# Patient Record
(demographics unavailable — no encounter records)

---

## 2025-01-08 NOTE — ASSESSMENT
[FreeTextEntry1] : 83 yo male on AS for prostate ca and on dual medical therapy for LUTS.  Will check his PSA today.  He will continue with tamsulosin 0.4 mg nightly and finasteride 5 mg daily.  He will RTC in 6 months for follow up.

## 2025-01-08 NOTE — PHYSICAL EXAM
[Normal Appearance] : normal appearance [General Appearance - In No Acute Distress] : no acute distress [Heart Rate And Rhythm] : heart rate and rhythm were normal [] : no respiratory distress [Normal Station and Gait] : the gait and station were normal for the patient's age [Skin Color & Pigmentation] : normal skin color and pigmentation [No Focal Deficits] : no focal deficits [Oriented To Time, Place, And Person] : oriented to person, place, and time [Not Anxious] : not anxious

## 2025-01-08 NOTE — HISTORY OF PRESENT ILLNESS
[FreeTextEntry1] : [Dr. Villavicencio previous notes] This is a 74yo male with recent PSA of 8.2. No previous PSA are available. His father was diagnosed with prostate cancer in his 70s but  in his 90s of other causes. He has significant LUTS with frequency, urgency, nocturia, straining, weak stream and double voiding. He has normal erections. No significant past medical or surgical history. He takes no medications.   3/28/16 Here for f/u. 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 He has significant LUTS and was recently started on tamsulosin by his PCP. Symptoms are mildly improved.   16 Here for f/u. No significant changes. Still c/o slow stream, incomplete emptying on tamsulosin. TRUS prostate volume was 24cc. 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   16 Here to review MRI results. MRI shows enlarged prostate, 92gm with median lobe, considerably larger than measured on transrectal US. No suspicious PI-RADS 4 or 5 lesions noted. BPH symptoms not bothersome with Flomax.  16 Here s/p prostate biopsy. No complications from biopsy. Path: Shayla 3+3=6 prostate cancer in anterior apex, 20% of core involved from that area, all other biopsies negative.   16 Here for f/u. Biopsy sent for Oncotype DX testing which showed high likelihood (88%) that he is in the very low risk category. Voiding symptoms stable, moderately bothersome.   3/6/17 Here for f/u. Feeling well, voiding symptoms not currently bothersome.   17 Here for f/u. Feeling well, no complaints. Due for repeat prostate biopsy.   17 Here for biopsy f/u. No complications from biopsy.  Path:  Right apex: Shayla 3+4, 5% tissue involved Left apex: Shayla 3+4, 5% tissue involved Anterior apex: [place] 3+4, 20% tissue involved  17 Here for f/u. Seen at [facility] for consultation for focal therapy.   10/23/17 Here for f/u. He was seen at [facility] and recommended to undergo EBRT. He is still interested in focal therapy.   17 Here for f/u. Awaiting consultation at [facility] to discuss focal therapy. Appointment is scheduled for mid-January.   18 Here for f/u. Had consultation at [facility] where biopsy was down-graded by pathology to Shayla 6 and was recommended to continue active surveillance.   18 Here for f/u. PSA has been stable. He stopped tamsulosin and no change in urinary symptoms.  19 Here for f/u. PSA has been stable. No bothersome symptoms. Unable to sit for last MRI due to claustrophobia.   19 Here for f/u. PSA has been stable around 8-9. No bothersome symptoms, taking tamsulosin 0.8mg at night.   20 Here for f/u. Last PSA stable at 9. No bothersome symptoms.   20 Here for f/u. Last PSA around 10. Primary complaint nocturia, no improvement with tamsulosin 0.8mg.   21 Here for f/u. Last PSA around 10. Primary complaint nocturia, taking tamsulosin 0.4mg.   21 Here for f/u. PSA has remained stable around 10, last PSA in 2021. Feeling well, no changes.   3/9/22 Here for f/u. Last PSA up to 14. Feeling well, no changes.   22: Here for f/u. Last PSA 12.8. Feeling well, no changes- still have nocturia, some UUI.  He is on tamsulosin 0.8 mg daily. PSA 12.6 2 months ago with his PCP.. Denies dysuria, gross hematuria. Reports no issues with erections.   5/3/23 Here for f/u. Reports he had imaging ordered by PCP for abdominal pain and was told  "his prostate has  gone into kidney."  Renal US  performed at [facility] shows a likely right renal cyst. This cyst is also seen on non-con CT performed , though it appears hyperdense and was told there was a mass. The patient is feeling well,  has no complaints.   23 Here for f/u. CT renal mass protocol shows renal lesion is a benign cyst PSA was down to 4 but he said outside PSA was 14 which is closer to his baseline.   23 Here for f/u. Doing well, no complaints  *************** 24: Patient returns for PSA.  He is voiding with his baseline LUTS.  His PVR today is stable.  He is otherwise well.  His last PSA was under 5.  He continues to take tamsulosin and finasteride for LUTS.   PVR = 294 PSA (23) = 4.37  ************ 25: Patient returns for follow up.  He is due for his PSA.  He has been on AS for low volume GG2 prostate cancer with stable PSA.  He continues to take tamsulosin and finasteride for LUTS.

## 2025-01-08 NOTE — HISTORY OF PRESENT ILLNESS
[FreeTextEntry1] : [Dr. Villavicencio previous notes] This is a 74yo male with recent PSA of 8.2. No previous PSA are available. His father was diagnosed with prostate cancer in his 70s but  in his 90s of other causes. He has significant LUTS with frequency, urgency, nocturia, straining, weak stream and double voiding. He has normal erections. No significant past medical or surgical history. He takes no medications.   3/28/16 Here for f/u. 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 He has significant LUTS and was recently started on tamsulosin by his PCP. Symptoms are mildly improved.   16 Here for f/u. No significant changes. Still c/o slow stream, incomplete emptying on tamsulosin. TRUS prostate volume was 24cc. 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   16 Here to review MRI results. MRI shows enlarged prostate, 92gm with median lobe, considerably larger than measured on transrectal US. No suspicious PI-RADS 4 or 5 lesions noted. BPH symptoms not bothersome with Flomax.  16 Here s/p prostate biopsy. No complications from biopsy. Path: Shayla 3+3=6 prostate cancer in anterior apex, 20% of core involved from that area, all other biopsies negative.   16 Here for f/u. Biopsy sent for Oncotype DX testing which showed high likelihood (88%) that he is in the very low risk category. Voiding symptoms stable, moderately bothersome.   3/6/17 Here for f/u. Feeling well, voiding symptoms not currently bothersome.   17 Here for f/u. Feeling well, no complaints. Due for repeat prostate biopsy.   17 Here for biopsy f/u. No complications from biopsy.  Path:  Right apex: Shayla 3+4, 5% tissue involved Left apex: Shayla 3+4, 5% tissue involved Anterior apex: [place] 3+4, 20% tissue involved  17 Here for f/u. Seen at [facility] for consultation for focal therapy.   10/23/17 Here for f/u. He was seen at [facility] and recommended to undergo EBRT. He is still interested in focal therapy.   17 Here for f/u. Awaiting consultation at [facility] to discuss focal therapy. Appointment is scheduled for mid-January.   18 Here for f/u. Had consultation at [facility] where biopsy was down-graded by pathology to Shayla 6 and was recommended to continue active surveillance.   18 Here for f/u. PSA has been stable. He stopped tamsulosin and no change in urinary symptoms.  19 Here for f/u. PSA has been stable. No bothersome symptoms. Unable to sit for last MRI due to claustrophobia.   19 Here for f/u. PSA has been stable around 8-9. No bothersome symptoms, taking tamsulosin 0.8mg at night.   20 Here for f/u. Last PSA stable at 9. No bothersome symptoms.   20 Here for f/u. Last PSA around 10. Primary complaint nocturia, no improvement with tamsulosin 0.8mg.   21 Here for f/u. Last PSA around 10. Primary complaint nocturia, taking tamsulosin 0.4mg.   21 Here for f/u. PSA has remained stable around 10, last PSA in 2021. Feeling well, no changes.   3/9/22 Here for f/u. Last PSA up to 14. Feeling well, no changes.   22: Here for f/u. Last PSA 12.8. Feeling well, no changes- still have nocturia, some UUI.  He is on tamsulosin 0.8 mg daily. PSA 12.6 2 months ago with his PCP.. Denies dysuria, gross hematuria. Reports no issues with erections.   5/3/23 Here for f/u. Reports he had imaging ordered by PCP for abdominal pain and was told  "his prostate has  gone into kidney."  Renal US  performed at [facility] shows a likely right renal cyst. This cyst is also seen on non-con CT performed , though it appears hyperdense and was told there was a mass. The patient is feeling well,  has no complaints.   23 Here for f/u. CT renal mass protocol shows renal lesion is a benign cyst PSA was down to 4 but he said outside PSA was 14 which is closer to his baseline.   23 Here for f/u. Doing well, no complaints  *************** 24: Patient returns for PSA.  He is voiding with his baseline LUTS.  His PVR today is stable.  He is otherwise well.  His last PSA was under 5.  He continues to take tamsulosin and finasteride for LUTS.   PVR = 294 PSA (23) = 4.37  ************ 25: Patient returns for follow up.  He is due for his PSA.  He has been on AS for low volume GG2 prostate cancer with stable PSA.  He continues to take tamsulosin and finasteride for LUTS.

## 2025-06-04 NOTE — ADDENDUM
[FreeTextEntry1] : I, Dr. Stern, personally performed the evaluation and management (E/M) services for this established patient who presents today with (a) new problem(s)/exacerbation of (an) existing condition(s).  That E/M includes conducting the examination, assessing all new/exacerbated conditions, and establishing a new plan of care.  Today, my ACP, Marlys Pierre, was here to observe my evaluation and management services for this new problem/exacerbated condition to be followed going forward. Never smoker

## 2025-06-04 NOTE — HISTORY OF PRESENT ILLNESS
[FreeTextEntry1] : [Dr. Villavicencio previous notes] This is a 72yo male with recent PSA of 8.2. No previous PSA are available. His father was diagnosed with prostate cancer in his 70s but  in his 90s of other causes. He has significant LUTS with frequency, urgency, nocturia, straining, weak stream and double voiding. He has normal erections. No significant past medical or surgical history. He takes no medications.  3/28/16 Here for f/u. 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 He has significant LUTS and was recently started on tamsulosin by his PCP. Symptoms are mildly improved.  16 Here for f/u. No significant changes. Still c/o slow stream, incomplete emptying on tamsulosin. TRUS prostate volume was 24cc. 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  16 Here to review MRI results. MRI shows enlarged prostate, 92gm with median lobe, considerably larger than measured on transrectal US. No suspicious PI-RADS 4 or 5 lesions noted. BPH symptoms not bothersome with Flomax.  16 Here s/p prostate biopsy. No complications from biopsy. Path: [place] 3+3=6 prostate cancer in anterior apex, 20% of core involved from that area, all other biopsies negative.  16 Here for f/u. Biopsy sent for Oncotype DX testing which showed high likelihood (88%) that he is in the very low risk category. Voiding symptoms stable, moderately bothersome.  3/6/17 Here for f/u. Feeling well, voiding symptoms not currently bothersome.  17 Here for f/u. Feeling well, no complaints. Due for repeat prostate biopsy.  17 Here for biopsy f/u. No complications from biopsy. Path: Right apex: Shayla 3+4, 5% tissue involved Left apex: Shayla 3+4, 5% tissue involved Anterior apex: [place] 3+4, 20% tissue involved  17 Here for f/u. Seen at [facility] for consultation for focal therapy.  10/23/17 Here for f/u. He was seen at [facility] and recommended to undergo EBRT. He is still interested in focal therapy.  17 Here for f/u. Awaiting consultation at [facility] to discuss focal therapy. Appointment is scheduled for mid-January.  18 Here for f/u. Had consultation at [facility] where biopsy was down-graded by pathology to Effie 6 and was recommended to continue active surveillance.  18 Here for f/u. PSA has been stable. He stopped tamsulosin and no change in urinary symptoms.  19 Here for f/u. PSA has been stable. No bothersome symptoms. Unable to sit for last MRI due to claustrophobia.  19 Here for f/u. PSA has been stable around 8-9. No bothersome symptoms, taking tamsulosin 0.8mg at night.  20 Here for f/u. Last PSA stable at 9. No bothersome symptoms.  20 Here for f/u. Last PSA around 10. Primary complaint nocturia, no improvement with tamsulosin 0.8mg.  21 Here for f/u. Last PSA around 10. Primary complaint nocturia, taking tamsulosin 0.4mg.  21 Here for f/u. PSA has remained stable around 10, last PSA in 2021. Feeling well, no changes.  3/9/22 Here for f/u. Last PSA up to 14. Feeling well, no changes.  22: Here for f/u. Last PSA 12.8. Feeling well, no changes- still have nocturia, some UUI. He is on tamsulosin 0.8 mg daily. PSA 12.6 2 months ago with his PCP.. Denies dysuria, gross hematuria. Reports no issues with erections.  5/3/23 Here for f/u. Reports he had imaging ordered by PCP for abdominal pain and was told "his prostate has gone into kidney." Renal US  performed at [facility] shows a likely right renal cyst. This cyst is also seen on non-con CT performed , though it appears hyperdense and was told there was a mass. The patient is feeling well, has no complaints.  23 Here for f/u. CT renal mass protocol shows renal lesion is a benign cyst PSA was down to 4 but he said outside PSA was 14 which is closer to his baseline.  23 Here for f/u. Doing well, no complaints  *************** 24: Patient returns for PSA. He is voiding with his baseline LUTS. His PVR today is stable. He is otherwise well. His last PSA was under 5. He continues to take tamsulosin and finasteride for LUTS.  PVR = 294 PSA (23) = 4.37  ************ 25: Patient returns for follow up. He is due for his PSA. He has been on AS for low volume GG2 prostate cancer with stable PSA. He continues to take tamsulosin and finasteride for LUTS.  ************ 25: Patient returns for follow up. He is on AS for low volume GG2 cancer and due for a PSA. His most recent biopsy was in 2018 at [facility] and he was downgraded to GG1. He does not want to have an MRI due to claustrophobia. He remains on dual therapy for LUTS. He has noticed his nocturia has increased to 3x and he's needing to double void. He also reports urgency however he is satisfied on medical therapy at this time. Denies dysuria or hematuria.  PVR = 320cc  PSA (25) = 4.74 (9.48 corrected)

## 2025-06-04 NOTE — PHYSICAL EXAM
[General Appearance - Well Developed] : well developed [General Appearance - Well Nourished] : well nourished [Normal Appearance] : normal appearance [Well Groomed] : well groomed [General Appearance - In No Acute Distress] : no acute distress [] : no respiratory distress [Exaggerated Use Of Accessory Muscles For Inspiration] : no accessory muscle use [Urinary Bladder Findings] : the bladder was normal on palpation [Normal Station and Gait] : the gait and station were normal for the patient's age [No Focal Deficits] : no focal deficits [Oriented To Time, Place, And Person] : oriented to person, place, and time [Affect] : the affect was normal [Mood] : the mood was normal

## 2025-06-04 NOTE — ASSESSMENT
[FreeTextEntry1] : 84yo male on AS for GG2 CaP. His most recent biopsy in 2018 with [facility] was downgraded to GG1. He does not wish to proceed with an MRI due to claustrophobia. He is comfortable with continuing PSA surveillance.   - PSA - Continue dual therapy  Follow up in 6 months